# Patient Record
Sex: MALE | Race: WHITE | NOT HISPANIC OR LATINO | Employment: FULL TIME | URBAN - METROPOLITAN AREA
[De-identification: names, ages, dates, MRNs, and addresses within clinical notes are randomized per-mention and may not be internally consistent; named-entity substitution may affect disease eponyms.]

---

## 2024-02-13 ENCOUNTER — APPOINTMENT (OUTPATIENT)
Dept: PRIMARY CARE | Facility: CLINIC | Age: 24
End: 2024-02-13
Payer: COMMERCIAL

## 2024-04-24 ENCOUNTER — APPOINTMENT (OUTPATIENT)
Dept: RADIOLOGY | Facility: HOSPITAL | Age: 24
End: 2024-04-24
Payer: COMMERCIAL

## 2024-04-24 ENCOUNTER — HOSPITAL ENCOUNTER (EMERGENCY)
Facility: HOSPITAL | Age: 24
Discharge: HOME | End: 2024-04-24
Attending: EMERGENCY MEDICINE
Payer: COMMERCIAL

## 2024-04-24 ENCOUNTER — OFFICE VISIT (OUTPATIENT)
Dept: PRIMARY CARE | Facility: CLINIC | Age: 24
End: 2024-04-24
Payer: COMMERCIAL

## 2024-04-24 VITALS
DIASTOLIC BLOOD PRESSURE: 90 MMHG | HEART RATE: 111 BPM | WEIGHT: 242 LBS | BODY MASS INDEX: 36.68 KG/M2 | HEIGHT: 68 IN | SYSTOLIC BLOOD PRESSURE: 130 MMHG | OXYGEN SATURATION: 96 % | TEMPERATURE: 97.7 F

## 2024-04-24 VITALS
RESPIRATION RATE: 16 BRPM | SYSTOLIC BLOOD PRESSURE: 137 MMHG | BODY MASS INDEX: 36.42 KG/M2 | HEIGHT: 68 IN | HEART RATE: 95 BPM | DIASTOLIC BLOOD PRESSURE: 68 MMHG | WEIGHT: 240.3 LBS | OXYGEN SATURATION: 97 % | TEMPERATURE: 98.2 F

## 2024-04-24 DIAGNOSIS — R10.9 FLANK PAIN: ICD-10-CM

## 2024-04-24 DIAGNOSIS — R10.32 LEFT LOWER QUADRANT ABDOMINAL PAIN: Primary | ICD-10-CM

## 2024-04-24 DIAGNOSIS — M54.12 CERVICAL RADICULAR PAIN: ICD-10-CM

## 2024-04-24 DIAGNOSIS — N50.812 PAIN IN LEFT TESTICLE: ICD-10-CM

## 2024-04-24 DIAGNOSIS — R10.32 LEFT LOWER QUADRANT ABDOMINAL PAIN: ICD-10-CM

## 2024-04-24 DIAGNOSIS — N50.812 TESTICULAR PAIN, LEFT: ICD-10-CM

## 2024-04-24 DIAGNOSIS — R74.01 TRANSAMINITIS: ICD-10-CM

## 2024-04-24 DIAGNOSIS — R35.0 URINARY FREQUENCY: ICD-10-CM

## 2024-04-24 DIAGNOSIS — R03.0 ELEVATED BLOOD PRESSURE READING: Primary | ICD-10-CM

## 2024-04-24 DIAGNOSIS — M54.40 ACUTE BILATERAL LOW BACK PAIN WITH SCIATICA, SCIATICA LATERALITY UNSPECIFIED: ICD-10-CM

## 2024-04-24 PROBLEM — G47.9 DIFFICULTY SLEEPING: Status: ACTIVE | Noted: 2024-04-24

## 2024-04-24 PROBLEM — R51.9 HEADACHE: Status: ACTIVE | Noted: 2024-04-24

## 2024-04-24 PROBLEM — E66.9 OBESE: Status: ACTIVE | Noted: 2024-04-24

## 2024-04-24 PROBLEM — F41.9 ANXIETY: Status: ACTIVE | Noted: 2024-04-24

## 2024-04-24 PROBLEM — E78.5 HYPERLIPIDEMIA: Status: ACTIVE | Noted: 2024-04-24

## 2024-04-24 PROBLEM — F41.0 PANIC ATTACK: Status: ACTIVE | Noted: 2024-04-24

## 2024-04-24 PROBLEM — L70.9 ACNE: Status: ACTIVE | Noted: 2024-04-24

## 2024-04-24 LAB
ALBUMIN SERPL-MCNC: 4.6 G/DL (ref 3.5–5)
ALP BLD-CCNC: 85 U/L (ref 35–125)
ALT SERPL-CCNC: 55 U/L (ref 5–40)
ANION GAP SERPL CALC-SCNC: 8 MMOL/L
APPEARANCE UR: CLEAR
AST SERPL-CCNC: 42 U/L (ref 5–40)
BASOPHILS # BLD AUTO: 0.08 X10*3/UL (ref 0–0.1)
BASOPHILS NFR BLD AUTO: 0.7 %
BILIRUB SERPL-MCNC: 0.4 MG/DL (ref 0.1–1.2)
BILIRUB UR STRIP.AUTO-MCNC: NEGATIVE MG/DL
BUN SERPL-MCNC: 17 MG/DL (ref 8–25)
CALCIUM SERPL-MCNC: 9.7 MG/DL (ref 8.5–10.4)
CHLORIDE SERPL-SCNC: 102 MMOL/L (ref 97–107)
CO2 SERPL-SCNC: 25 MMOL/L (ref 24–31)
COLOR UR: YELLOW
CREAT SERPL-MCNC: 0.9 MG/DL (ref 0.4–1.6)
EGFRCR SERPLBLD CKD-EPI 2021: >90 ML/MIN/1.73M*2
EOSINOPHIL # BLD AUTO: 0.11 X10*3/UL (ref 0–0.7)
EOSINOPHIL NFR BLD AUTO: 0.9 %
ERYTHROCYTE [DISTWIDTH] IN BLOOD BY AUTOMATED COUNT: 12.3 % (ref 11.5–14.5)
GLUCOSE SERPL-MCNC: 94 MG/DL (ref 65–99)
GLUCOSE UR STRIP.AUTO-MCNC: NORMAL MG/DL
HCT VFR BLD AUTO: 41.8 % (ref 41–52)
HGB BLD-MCNC: 15.2 G/DL (ref 13.5–17.5)
IMM GRANULOCYTES # BLD AUTO: 0.08 X10*3/UL (ref 0–0.7)
IMM GRANULOCYTES NFR BLD AUTO: 0.7 % (ref 0–0.9)
KETONES UR STRIP.AUTO-MCNC: NEGATIVE MG/DL
LEUKOCYTE ESTERASE UR QL STRIP.AUTO: NEGATIVE
LIPASE SERPL-CCNC: 20 U/L (ref 16–63)
LYMPHOCYTES # BLD AUTO: 5.19 X10*3/UL (ref 1.2–4.8)
LYMPHOCYTES NFR BLD AUTO: 43.6 %
MCH RBC QN AUTO: 31.1 PG (ref 26–34)
MCHC RBC AUTO-ENTMCNC: 36.4 G/DL (ref 32–36)
MCV RBC AUTO: 86 FL (ref 80–100)
MONOCYTES # BLD AUTO: 0.96 X10*3/UL (ref 0.1–1)
MONOCYTES NFR BLD AUTO: 8.1 %
NEUTROPHILS # BLD AUTO: 5.48 X10*3/UL (ref 1.2–7.7)
NEUTROPHILS NFR BLD AUTO: 46 %
NITRITE UR QL STRIP.AUTO: NEGATIVE
NRBC BLD-RTO: 0 /100 WBCS (ref 0–0)
PH UR STRIP.AUTO: 6 [PH]
PLATELET # BLD AUTO: 295 X10*3/UL (ref 150–450)
POC APPEARANCE, URINE: CLEAR
POC BILIRUBIN, URINE: NEGATIVE
POC BLOOD, URINE: NEGATIVE
POC COLOR, URINE: YELLOW
POC GLUCOSE, URINE: NEGATIVE MG/DL
POC KETONES, URINE: NEGATIVE MG/DL
POC LEUKOCYTES, URINE: ABNORMAL
POC NITRITE,URINE: NEGATIVE
POC PH, URINE: 6.5 PH
POC PROTEIN, URINE: NEGATIVE MG/DL
POC SPECIFIC GRAVITY, URINE: >=1.03
POC UROBILINOGEN, URINE: 0.2 EU/DL
POTASSIUM SERPL-SCNC: 3.5 MMOL/L (ref 3.4–5.1)
PROT SERPL-MCNC: 7.3 G/DL (ref 5.9–7.9)
PROT UR STRIP.AUTO-MCNC: NEGATIVE MG/DL
RBC # BLD AUTO: 4.89 X10*6/UL (ref 4.5–5.9)
RBC # UR STRIP.AUTO: NEGATIVE /UL
SODIUM SERPL-SCNC: 135 MMOL/L (ref 133–145)
SP GR UR STRIP.AUTO: 1.03
UROBILINOGEN UR STRIP.AUTO-MCNC: NORMAL MG/DL
WBC # BLD AUTO: 11.9 X10*3/UL (ref 4.4–11.3)

## 2024-04-24 PROCEDURE — 87086 URINE CULTURE/COLONY COUNT: CPT | Mod: WESLAB | Performed by: NURSE PRACTITIONER

## 2024-04-24 PROCEDURE — 83690 ASSAY OF LIPASE: CPT | Performed by: EMERGENCY MEDICINE

## 2024-04-24 PROCEDURE — 96361 HYDRATE IV INFUSION ADD-ON: CPT

## 2024-04-24 PROCEDURE — 85025 COMPLETE CBC W/AUTO DIFF WBC: CPT | Performed by: EMERGENCY MEDICINE

## 2024-04-24 PROCEDURE — 74176 CT ABD & PELVIS W/O CONTRAST: CPT | Performed by: RADIOLOGY

## 2024-04-24 PROCEDURE — 2500000004 HC RX 250 GENERAL PHARMACY W/ HCPCS (ALT 636 FOR OP/ED): Performed by: EMERGENCY MEDICINE

## 2024-04-24 PROCEDURE — 96360 HYDRATION IV INFUSION INIT: CPT

## 2024-04-24 PROCEDURE — 74176 CT ABD & PELVIS W/O CONTRAST: CPT

## 2024-04-24 PROCEDURE — 99213 OFFICE O/P EST LOW 20 MIN: CPT | Performed by: NURSE PRACTITIONER

## 2024-04-24 PROCEDURE — 93975 VASCULAR STUDY: CPT

## 2024-04-24 PROCEDURE — 81003 URINALYSIS AUTO W/O SCOPE: CPT | Performed by: NURSE PRACTITIONER

## 2024-04-24 PROCEDURE — 99285 EMERGENCY DEPT VISIT HI MDM: CPT | Mod: 25

## 2024-04-24 PROCEDURE — 81003 URINALYSIS AUTO W/O SCOPE: CPT | Performed by: EMERGENCY MEDICINE

## 2024-04-24 PROCEDURE — 80053 COMPREHEN METABOLIC PANEL: CPT | Performed by: EMERGENCY MEDICINE

## 2024-04-24 PROCEDURE — 36415 COLL VENOUS BLD VENIPUNCTURE: CPT | Performed by: EMERGENCY MEDICINE

## 2024-04-24 RX ORDER — ACETAMINOPHEN 325 MG/1
975 TABLET ORAL ONCE
Status: DISCONTINUED | OUTPATIENT
Start: 2024-04-24 | End: 2024-04-25 | Stop reason: HOSPADM

## 2024-04-24 RX ORDER — KETOROLAC TROMETHAMINE 30 MG/ML
15 INJECTION, SOLUTION INTRAMUSCULAR; INTRAVENOUS ONCE
Status: DISCONTINUED | OUTPATIENT
Start: 2024-04-24 | End: 2024-04-25 | Stop reason: HOSPADM

## 2024-04-24 RX ORDER — IBUPROFEN 600 MG/1
600 TABLET ORAL EVERY 6 HOURS PRN
Qty: 20 TABLET | Refills: 0 | Status: SHIPPED | OUTPATIENT
Start: 2024-04-24 | End: 2024-04-29

## 2024-04-24 RX ADMIN — SODIUM CHLORIDE 1000 ML: 9 INJECTION, SOLUTION INTRAVENOUS at 21:45

## 2024-04-24 ASSESSMENT — ENCOUNTER SYMPTOMS
ABDOMINAL PAIN: 1
NAUSEA: 1

## 2024-04-24 ASSESSMENT — LIFESTYLE VARIABLES
HOW OFTEN DO YOU HAVE SIX OR MORE DRINKS ON ONE OCCASION: NEVER
SKIP TO QUESTIONS 9-10: 0
AUDIT-C TOTAL SCORE: 4
HOW OFTEN DO YOU HAVE A DRINK CONTAINING ALCOHOL: 2-3 TIMES A WEEK
HOW MANY STANDARD DRINKS CONTAINING ALCOHOL DO YOU HAVE ON A TYPICAL DAY: 3 OR 4

## 2024-04-24 ASSESSMENT — PAIN - FUNCTIONAL ASSESSMENT: PAIN_FUNCTIONAL_ASSESSMENT: 0-10

## 2024-04-24 ASSESSMENT — PAIN SCALES - GENERAL: PAINLEVEL: 4

## 2024-04-24 ASSESSMENT — COLUMBIA-SUICIDE SEVERITY RATING SCALE - C-SSRS
2. HAVE YOU ACTUALLY HAD ANY THOUGHTS OF KILLING YOURSELF?: NO
6. HAVE YOU EVER DONE ANYTHING, STARTED TO DO ANYTHING, OR PREPARED TO DO ANYTHING TO END YOUR LIFE?: NO
1. IN THE PAST MONTH, HAVE YOU WISHED YOU WERE DEAD OR WISHED YOU COULD GO TO SLEEP AND NOT WAKE UP?: NO

## 2024-04-24 ASSESSMENT — PATIENT HEALTH QUESTIONNAIRE - PHQ9
2. FEELING DOWN, DEPRESSED OR HOPELESS: NOT AT ALL
SUM OF ALL RESPONSES TO PHQ9 QUESTIONS 1 AND 2: 0
1. LITTLE INTEREST OR PLEASURE IN DOING THINGS: NOT AT ALL

## 2024-04-24 NOTE — PROGRESS NOTES
"Subjective   Patient ID: Shane Carney is a 24 y.o. male who presents for Abdominal Pain (Pt c/o stomach pain and left testicle pain x couple days/Pt also has ongoing back pain).    PT HAS HAD LOWER ABDOMINAL PAIN and testicular pain, worried he has cancer,     Abdominal Pain  This is a new problem. The current episode started in the past 7 days. The problem occurs constantly. The pain is located in the LLQ. The pain is at a severity of 4/10. The pain is moderate. Associated symptoms include nausea.        Review of Systems   Gastrointestinal:  Positive for abdominal pain and nausea.   Genitourinary:  Positive for testicular pain.       Objective   /90   Pulse (!) 111   Temp 36.5 °C (97.7 °F)   Ht 1.715 m (5' 7.5\")   Wt 110 kg (242 lb)   SpO2 96%   BMI 37.34 kg/m²     Physical Exam  Constitutional:       Appearance: Normal appearance. He is obese.   Pulmonary:      Effort: Pulmonary effort is normal.   Abdominal:      Tenderness: There is abdominal tenderness.      Comments: Lower abdominal tenderness no rebound ,    Genitourinary:     Comments: Has discomfort in left testicle.holding that area during visit  Musculoskeletal:         General: Normal range of motion.   Neurological:      Mental Status: He is alert and oriented to person, place, and time.   Psychiatric:         Behavior: Behavior normal.         Assessment/Plan   Problem List Items Addressed This Visit    None  Visit Diagnoses         Codes    Elevated blood pressure reading    -  Primary R03.0    Acute bilateral low back pain with sciatica, sciatica laterality unspecified     M54.40    Cervical radicular pain     M54.12    Pain in left testicle     N50.812    Left lower quadrant abdominal pain     R10.32               "

## 2024-04-24 NOTE — Clinical Note
Shane Carney was seen and treated in our emergency department on 4/24/2024.  He may return to work on 04/25/2024.       If you have any questions or concerns, please don't hesitate to call.      Jenn Campbell MD

## 2024-04-25 NOTE — ED PROVIDER NOTES
HPI   Chief Complaint   Patient presents with    Abdominal Pain     Pt states that he has been having LLQ abd pain and left testicle pain for about 4 days.  Pt states that he does heavy lifting at work a lot.  Pt was sent over by his PCP in the [facility]                    No data recorded                   Patient History   Past Medical History:   Diagnosis Date    Cough, unspecified 12/28/2013    Cough    Personal history of diseases of the skin and subcutaneous tissue 09/25/2013    History of folliculitis    Personal history of other diseases of the musculoskeletal system and connective tissue 02/11/2015    History of backache    Personal history of other diseases of the nervous system and sense organs 07/30/2015    History of acute otitis media    Personal history of other diseases of the nervous system and sense organs 12/28/2013    History of conjunctivitis    Personal history of other diseases of the respiratory system 06/02/2014    History of pharyngitis    Personal history of other diseases of the respiratory system 12/28/2013    History of pharyngitis    Personal history of other diseases of the respiratory system 06/02/2014    History of pharyngitis    Personal history of other infectious and parasitic diseases 12/28/2013    History of viral infection    Personal history of other infectious and parasitic diseases 12/05/2014    History of infectious mononucleosis    Personal history of other specified conditions 12/04/2014    History of fever    Personal history of other specified conditions 12/28/2013    History of nausea    Personal history of other specified conditions 12/04/2014    History of fatigue     No past surgical history on file.  Family History   Problem Relation Name Age of Onset    No Known Problems Mother      Diabetes Father       Social History     Tobacco Use    Smoking status: Every Day     Current packs/day: 1.50     Types: Cigarettes    Smokeless tobacco: Never    Substance Use Topics    Alcohol use: Yes    Drug use: Never       Physical Exam   ED Triage Vitals [04/24/24 2046]   Temperature Heart Rate Respirations BP   36.8 °C (98.2 °F) 95 16 137/68      Pulse Ox Temp Source Heart Rate Source Patient Position   97 % Oral Monitor Sitting      BP Location FiO2 (%)     Left arm --       Physical Exam    ED Course & MDM   Diagnoses as of 04/24/24 2332   Left lower quadrant abdominal pain   Testicular pain, left   Transaminitis   Flank pain       Medical Decision Making    The patient is a 24-year-old male presenting to the emergency department for evaluation of left-sided flank pain, left lower quadrant abdominal pain and left-sided testicular pain.  The patient states that he has been having intermittent pain in the left flank and left lower quadrant of his abdomen for the past 3 to 4 days.  He states he was doing some heavy lifting at work and then he was having worsening pain.  He states he went to urgent care and they told him he needed to come to the emergency room to have an ultrasound done of his testicle because the provider felt like he had pain when she did his exam.  He denies seeing any testicular pain on his own.  He denies any headache or visual changes.  No chest pain or shortness of breath.  No midline neck or back pain.  No focal weakness or numbness.  No fever or chills.  No cough or congestion.  No nausea, vomiting or diarrhea.  No urinary complaints.  No urethral discharge.  All pertinent positives and negatives are recorded above.  All other systems reviewed and otherwise negative.  Vital signs within normal limits.  Physical exam with a well-nourished well-developed male in no acute distress.  HEENT exam within normal limits.  He has no evidence of airway compromise or respiratory distress.  Abdominal exam is benign.  He has no gross motor, neurologic or vascular deficits on exam.  No flank pain with percussion or palpation.  No focal midline neck or back  pain with palpation.  Strength is 5 of 5 in all 4 extremities.  Sensation is intact.  Reflexes are intact.  He is able to walk and stand without difficulty.   exam within normal limits.  No palpable masses.  Testicles in normal position.      Oral acetaminophen, IV Toradol and IV fluids ordered.      Diagnostic labs without significant abnormality      US scrotum w doppler   Final Result   No sonographic evidence of torsion.        MACRO:   None             Signed by: Gloria Nance 4/24/2024 11:15 PM   Dictation workstation:   [id number]      CT abdomen pelvis wo IV contrast   Final Result   No acute abdominal or pelvic process.        Hepatic steatosis.        MACRO:   None        Signed by: Jojo Chin 4/24/2024 10:00 PM   Dictation workstation:   [id number]           CT abdomen pelvis with no evidence of ureterolithiasis.  No evidence of acute process such as pancreatitis, cholecystitis, choledocholithiasis, diverticulitis, or appendicitis.  No mass.  The testicular ultrasound does not show any evidence of torsion or mass.  The patient does not show any evidence of infection or significant lab abnormality other than some mild transaminitis on diagnostic labs.  He does not have any evidence of hemodynamic instability in the emergency room      Patient was released in good condition.  He was instructed to follow-up with his primary care physician within 1 to 2 days for further management of his current symptoms.  He was also given a referral to gastroenterology for further management of the transaminitis.  He will return to the emergency department sooner with worsening of symptoms or onset of new symptoms.  Rx given for ibuprofen.      Impression/diagnosis  Left-sided flank pain  Left lower quadrant abdominal pain  Testicular pain, left-sided  Transaminitis      I reviewed the results of the diagnostic labs and diagnostic imaging.  Formal radiology reading was completed by the  radiologist    Procedure  Procedures     Jenn Campbell MD  04/24/24 6712       Jenn Campbell MD  04/24/24 2112

## 2024-04-25 NOTE — DISCHARGE INSTRUCTIONS
Follow-up with your primary care physician within 1 to 2 days for further management of your current symptoms.      Follow-up with gastroenterology for further management of your elevated liver function test.    Return to the emergency department sooner with worsening of symptoms or onset of new symptoms

## 2024-04-26 LAB — BACTERIA UR CULT: NORMAL

## 2024-07-18 ENCOUNTER — APPOINTMENT (OUTPATIENT)
Dept: RADIOLOGY | Facility: HOSPITAL | Age: 24
End: 2024-07-18
Payer: COMMERCIAL

## 2024-07-18 ENCOUNTER — HOSPITAL ENCOUNTER (EMERGENCY)
Facility: HOSPITAL | Age: 24
Discharge: HOME | End: 2024-07-18
Attending: EMERGENCY MEDICINE
Payer: COMMERCIAL

## 2024-07-18 VITALS
TEMPERATURE: 98.6 F | BODY MASS INDEX: 37.36 KG/M2 | OXYGEN SATURATION: 97 % | HEART RATE: 89 BPM | SYSTOLIC BLOOD PRESSURE: 142 MMHG | RESPIRATION RATE: 17 BRPM | HEIGHT: 68 IN | WEIGHT: 246.47 LBS | DIASTOLIC BLOOD PRESSURE: 78 MMHG

## 2024-07-18 DIAGNOSIS — M79.5 SOFT TISSUES FOREIGN BODY: Primary | ICD-10-CM

## 2024-07-18 PROCEDURE — 2500000001 HC RX 250 WO HCPCS SELF ADMINISTERED DRUGS (ALT 637 FOR MEDICARE OP): Performed by: PHYSICIAN ASSISTANT

## 2024-07-18 PROCEDURE — 2500000004 HC RX 250 GENERAL PHARMACY W/ HCPCS (ALT 636 FOR OP/ED): Performed by: PHYSICIAN ASSISTANT

## 2024-07-18 PROCEDURE — 10120 INC&RMVL FB SUBQ TISS SMPL: CPT

## 2024-07-18 PROCEDURE — 99283 EMERGENCY DEPT VISIT LOW MDM: CPT | Mod: 25

## 2024-07-18 PROCEDURE — 73130 X-RAY EXAM OF HAND: CPT | Mod: RT

## 2024-07-18 PROCEDURE — 73130 X-RAY EXAM OF HAND: CPT | Mod: RIGHT SIDE | Performed by: RADIOLOGY

## 2024-07-18 PROCEDURE — 90471 IMMUNIZATION ADMIN: CPT | Performed by: PHYSICIAN ASSISTANT

## 2024-07-18 PROCEDURE — 90715 TDAP VACCINE 7 YRS/> IM: CPT | Performed by: PHYSICIAN ASSISTANT

## 2024-07-18 PROCEDURE — 2500000005 HC RX 250 GENERAL PHARMACY W/O HCPCS: Performed by: PHYSICIAN ASSISTANT

## 2024-07-18 RX ORDER — LIDOCAINE HYDROCHLORIDE 10 MG/ML
10 INJECTION INFILTRATION; PERINEURAL ONCE
Status: COMPLETED | OUTPATIENT
Start: 2024-07-18 | End: 2024-07-18

## 2024-07-18 RX ORDER — CEPHALEXIN 500 MG/1
500 CAPSULE ORAL ONCE
Status: COMPLETED | OUTPATIENT
Start: 2024-07-18 | End: 2024-07-18

## 2024-07-18 RX ORDER — ACETAMINOPHEN 325 MG/1
650 TABLET ORAL ONCE
Status: DISCONTINUED | OUTPATIENT
Start: 2024-07-18 | End: 2024-07-18 | Stop reason: HOSPADM

## 2024-07-18 RX ORDER — CEPHALEXIN 500 MG/1
500 CAPSULE ORAL 4 TIMES DAILY
Qty: 28 CAPSULE | Refills: 0 | Status: SHIPPED | OUTPATIENT
Start: 2024-07-18 | End: 2024-07-25

## 2024-07-18 ASSESSMENT — PAIN - FUNCTIONAL ASSESSMENT
PAIN_FUNCTIONAL_ASSESSMENT: 0-10
PAIN_FUNCTIONAL_ASSESSMENT: 0-10

## 2024-07-18 ASSESSMENT — COLUMBIA-SUICIDE SEVERITY RATING SCALE - C-SSRS
2. HAVE YOU ACTUALLY HAD ANY THOUGHTS OF KILLING YOURSELF?: NO
1. IN THE PAST MONTH, HAVE YOU WISHED YOU WERE DEAD OR WISHED YOU COULD GO TO SLEEP AND NOT WAKE UP?: NO
6. HAVE YOU EVER DONE ANYTHING, STARTED TO DO ANYTHING, OR PREPARED TO DO ANYTHING TO END YOUR LIFE?: NO

## 2024-07-18 ASSESSMENT — PAIN SCALES - GENERAL: PAINLEVEL_OUTOF10: 0 - NO PAIN

## 2024-07-18 NOTE — DISCHARGE INSTRUCTIONS
Be sure to follow up as directed in 1-2 days.  All of the details of your follow up instructions are detailed in the follow up section of this packet.         It is important to remember that your care does not end here and you must continue to monitor your condition closely. Please return to the emergency department for any worsening or concerning signs or symptoms as directed by our conversations and the discharge instructions. Otherwise please follow up with your doctor in 2 days if no better or worse. If you do not have a doctor please contact the referral number on your discharge instructions. Please contact any physician specialists provided in your discharge notes as it is very important to follow up with them regarding your condition. If you are unable to reach the physicians provided, please come back to the Emergency Department at any time.        Return to emergency room without delay for ANY new or worsening pains or for any other symptoms or concerns.

## 2024-07-18 NOTE — ED PROVIDER NOTES
HPI   Chief Complaint   Patient presents with    Hand Injury     Patient was at work, working with metal and a piece went into his right hand. Unknown last tetanus.       HPI  Patient 24-year-old male here for evaluation of possible metal foreign object in his right hand, right between the fourth and fifth digits on the dorsum of the hand he has a small area that appears to be a puncture wound and believes there is a piece of metal in it.  Unsure on last tetanus shot, no other area of pain or injury.      Patient History   Past Medical History:   Diagnosis Date    Cough, unspecified 12/28/2013    Cough    Personal history of diseases of the skin and subcutaneous tissue 09/25/2013    History of folliculitis    Personal history of other diseases of the musculoskeletal system and connective tissue 02/11/2015    History of backache    Personal history of other diseases of the nervous system and sense organs 07/30/2015    History of acute otitis media    Personal history of other diseases of the nervous system and sense organs 12/28/2013    History of conjunctivitis    Personal history of other diseases of the respiratory system 06/02/2014    History of pharyngitis    Personal history of other diseases of the respiratory system 12/28/2013    History of pharyngitis    Personal history of other diseases of the respiratory system 06/02/2014    History of pharyngitis    Personal history of other infectious and parasitic diseases 12/28/2013    History of viral infection    Personal history of other infectious and parasitic diseases 12/05/2014    History of infectious mononucleosis    Personal history of other specified conditions 12/04/2014    History of fever    Personal history of other specified conditions 12/28/2013    History of nausea    Personal history of other specified conditions 12/04/2014    History of fatigue     No past surgical history on file.  Family History   Problem Relation Name Age of Onset    No Known  Problems Mother      Diabetes Father       Social History     Tobacco Use    Smoking status: Every Day     Current packs/day: 1.50     Types: Cigarettes    Smokeless tobacco: Never   Substance Use Topics    Alcohol use: Yes    Drug use: Never       Physical Exam   ED Triage Vitals   Temp Pulse Resp BP   -- -- -- --      SpO2 Temp src Heart Rate Source Patient Position   -- -- -- --      BP Location FiO2 (%)     -- --       Physical Exam  GENERAL APPEARANCE: This patient is in no acute respiratory distress. Awake and alert.talking appropriately. Answering questions appropriately. No evidence of pressured speech     VITAL SIGNS: As per the nurses' triage record.     HEENT: Normocephalic, atraumatic.     NECK:  full gross ROM during exam    MUSCULOSKELETAL: Patient has a small puncture wound to the dorsal right hand between the MCP joint of the fourth and fifth digits, there is a very small area of dried blood around the entrance of the puncture wound.  No clear foreign object protruding.     NEUROLOGICAL: Awake, alert and oriented x 3.    IMMUNOLOGICAL: No palpable lymphadenopathy or lymphatic streaking noted on visible skin.    DERM: No petechiae, rashes, or ecchymoses. on visible skin    PSYCH: mood and affect appear normal.      ED Course & MDM   Diagnoses as of 07/18/24 1828   Soft tissues foreign body                       No data recorded                      Medical Decision Making  Parts of this chart have been completed using voice recognition software. Please excuse any errors of transcription.  My thought process and reason for plan has been formulated from the time that I saw the patient until the time of disposition and is not specific to one specific moment during their visit and furthermore my MDM encompasses this entire chart and not only this text box.      HPI: Detailed above.    Exam: A medically appropriate exam performed, outlined above, given the known history and presentation.    History Limited  by: Nothing    History obtained from: The patient    External/internal records reviewed: No external records reviewed    Social Determinants of Health considered during this visit: Lives at home    Chronic conditions impacting care: Denies    Medications given during visit:  Medications   lidocaine (Xylocaine) 10 mg/mL (1 %) injection 10 mL (has no administration in time range)   acetaminophen (Tylenol) tablet 650 mg (650 mg oral Not Given 7/18/24 1810)   cephalexin (Keflex) capsule 500 mg (has no administration in time range)   diphth,pertus(acell),tetanus (BoostRIX) 2.5-8-5 Lf-mcg-Lf/0.5mL vaccine 0.5 mL (0.5 mL intramuscular Given 7/18/24 1750)        Diagnostic/tests  Labs Reviewed - No data to display   XR hand right 3+ views   Final Result   Metallic radiopaque foreign object at the 4th interdigital space   dorsally. Small avulsion fracture at the head of the 5th metacarpal             MACRO:   None        Signed by: Renato Pinedo 7/18/2024 5:55 PM   Dictation workstation:   [id number]      XR hand right 3+ views    (Results Pending)       Prescription medications considered: Keflex initiated    Considerations/further MDM:  Differential includes fracture, dislocation, retained foreign object, neurovascular compromise or tendon dysfunction.            Procedure  Foreign Body Removal - Embedded    Performed by: Rocky Mckay PA-C  Authorized by: Jenn Campbell MD    Consent:     Consent obtained:  Verbal    Consent given by:  Patient  Universal protocol:     Patient identity confirmed:  Verbally with patient  Location:     Location:  Hand    Hand location:  R hand dorsum  Pre-procedure details:     Imaging:  X-ray    Neurovascular status: intact      Preparation: Patient was prepped and draped in usual sterile fashion    Anesthesia:     Anesthesia method:  Local infiltration    Local anesthetic:  Lidocaine 1% w/o epi  Procedure type:     Procedure complexity:  Simple  Procedure details:     Incision  length:  2mm    Removal mechanism: Pen magnet.    Guidance comment:  Magnet  Post-procedure details:     Skin closure:  None    Dressing:  Antibiotic ointment    Procedure completion:  Tolerated  Comments:      When the patient arrived when I asked him how he knew there was something in his skin he used a small magnet and was able to make it move underneath the skin, using a similar technique I was able to clearly and easily identify the foreign object while keeping the magnet attached I was able to just ever so slightly score the skin in the location in which it was starting to protrude on the magnet.  I was able to then open the skin with approximately a 2 mm incision with a scalpel and the object remained magnetized to the pen Magnant and was easily removed.  Removed in its entirety.  No complications, patient tolerated well.  Good neurovascular status and range of motion status post procedure.       Rocky Mckay PA-C  07/18/24 1825

## 2024-07-18 NOTE — PROGRESS NOTES
Attestation/Supervisory note for JB Mckay      The patient is a 24-year-old male presenting to the emergency department for evaluation of a possible foreign body in his right hand.  He states he was at work and was using a tool to work on some scrap metal.  He states that something flew off of it and punctured his right hand near the fourth finger knuckle.  He states it happened about 20 to 30 minutes prior to arrival.  No other injury or trauma.  No weakness or numbness.  Unknown date of last tetanus.  No headache or visual changes.  No chest pain or shortness of breath.  No abdominal pain.  No nausea vomiting.  No weakness or numbness.  All pertinent positives and negatives are recorded above.  All other systems reviewed and otherwise negative.  Vital signs within normal limits.  Physical exam with a well-nourished well-developed male in no acute distress.  HEENT exam within normal limits.  He has no evidence of airway compromise or respiratory distress.  Abdominal exam is benign.  He does have a superficial abrasion/laceration to the dorsum of the right hand near the fourth finger MCP joint.  Using a magnet, there is a foreign body that seems to move with it.      Wound care provided by nursing staff.  Tetanus was updated.      Oral acetaminophen was ordered      XR hand right 3+ views   Final Result   Metallic radiopaque foreign object at the 4th interdigital space   dorsally. Small avulsion fracture at the head of the 5th metacarpal             MACRO:   None        Signed by: Renato Pinedo 7/18/2024 5:55 PM   Dictation workstation:   [id number]           Wound repair and metallic foreign body removal was performed by JB Mckay without complication.      The patient was released in good condition.  He will follow-up with the [facility] of her scalp rotation provider within 1 to 2 days for wound check.  He will return to the emergency department sooner with worsening of symptoms or onset of new  symptoms      Impression/diagnosis:  Metallic foreign body, right hand  Puncture wound, right hand      I personally saw the patient and made/approve the management plan and take responsibility for the patient management.      I personally discussed the patient's management with the patient      I reviewed the results of the diagnostic imaging.  Formal radiology read was completed by the radiologist.      Jenn Campbell MD

## 2025-04-21 ENCOUNTER — APPOINTMENT (OUTPATIENT)
Dept: RADIOLOGY | Facility: HOSPITAL | Age: 25
End: 2025-04-21
Payer: COMMERCIAL

## 2025-04-21 ENCOUNTER — HOSPITAL ENCOUNTER (EMERGENCY)
Facility: HOSPITAL | Age: 25
Discharge: HOME | End: 2025-04-21
Payer: COMMERCIAL

## 2025-04-21 VITALS
HEIGHT: 68 IN | TEMPERATURE: 97.9 F | HEART RATE: 99 BPM | WEIGHT: 258.82 LBS | SYSTOLIC BLOOD PRESSURE: 160 MMHG | BODY MASS INDEX: 39.23 KG/M2 | OXYGEN SATURATION: 96 % | RESPIRATION RATE: 17 BRPM | DIASTOLIC BLOOD PRESSURE: 72 MMHG

## 2025-04-21 DIAGNOSIS — M54.12 CERVICAL RADICULOPATHY: Primary | ICD-10-CM

## 2025-04-21 PROCEDURE — 72125 CT NECK SPINE W/O DYE: CPT | Performed by: RADIOLOGY

## 2025-04-21 PROCEDURE — 72125 CT NECK SPINE W/O DYE: CPT

## 2025-04-21 PROCEDURE — 96372 THER/PROPH/DIAG INJ SC/IM: CPT

## 2025-04-21 PROCEDURE — 99284 EMERGENCY DEPT VISIT MOD MDM: CPT | Mod: 25

## 2025-04-21 PROCEDURE — 2500000005 HC RX 250 GENERAL PHARMACY W/O HCPCS

## 2025-04-21 PROCEDURE — 73030 X-RAY EXAM OF SHOULDER: CPT | Mod: LT

## 2025-04-21 PROCEDURE — 2500000004 HC RX 250 GENERAL PHARMACY W/ HCPCS (ALT 636 FOR OP/ED): Mod: JZ

## 2025-04-21 PROCEDURE — 73030 X-RAY EXAM OF SHOULDER: CPT | Mod: LEFT SIDE | Performed by: RADIOLOGY

## 2025-04-21 RX ORDER — METHOCARBAMOL 500 MG/1
500 TABLET, FILM COATED ORAL 2 TIMES DAILY
Qty: 20 TABLET | Refills: 0 | Status: SHIPPED | OUTPATIENT
Start: 2025-04-21 | End: 2025-05-01

## 2025-04-21 RX ORDER — ORPHENADRINE CITRATE 30 MG/ML
60 INJECTION INTRAMUSCULAR; INTRAVENOUS ONCE
Status: COMPLETED | OUTPATIENT
Start: 2025-04-21 | End: 2025-04-21

## 2025-04-21 RX ORDER — NAPROXEN 500 MG/1
500 TABLET ORAL
Qty: 30 TABLET | Refills: 0 | Status: SHIPPED | OUTPATIENT
Start: 2025-04-21 | End: 2025-05-06

## 2025-04-21 RX ORDER — PREDNISONE 50 MG/1
50 TABLET ORAL ONCE
Status: COMPLETED | OUTPATIENT
Start: 2025-04-21 | End: 2025-04-21

## 2025-04-21 RX ORDER — LIDOCAINE 50 MG/G
1 PATCH TOPICAL DAILY
Qty: 6 PATCH | Refills: 0 | Status: SHIPPED | OUTPATIENT
Start: 2025-04-21

## 2025-04-21 RX ORDER — PREDNISONE 20 MG/1
40 TABLET ORAL DAILY
Qty: 10 TABLET | Refills: 0 | Status: SHIPPED | OUTPATIENT
Start: 2025-04-21 | End: 2025-04-26

## 2025-04-21 RX ORDER — LIDOCAINE 560 MG/1
1 PATCH PERCUTANEOUS; TOPICAL; TRANSDERMAL ONCE
Status: DISCONTINUED | OUTPATIENT
Start: 2025-04-21 | End: 2025-04-21 | Stop reason: HOSPADM

## 2025-04-21 RX ORDER — KETOROLAC TROMETHAMINE 30 MG/ML
30 INJECTION, SOLUTION INTRAMUSCULAR; INTRAVENOUS ONCE
Status: COMPLETED | OUTPATIENT
Start: 2025-04-21 | End: 2025-04-21

## 2025-04-21 RX ADMIN — LIDOCAINE 1 PATCH: 4 PATCH TOPICAL at 18:45

## 2025-04-21 RX ADMIN — KETOROLAC TROMETHAMINE 30 MG: 30 INJECTION, SOLUTION INTRAMUSCULAR at 18:45

## 2025-04-21 RX ADMIN — PREDNISONE 50 MG: 50 TABLET ORAL at 18:46

## 2025-04-21 RX ADMIN — ORPHENADRINE CITRATE 60 MG: 30 INJECTION INTRAMUSCULAR; INTRAVENOUS at 18:45

## 2025-04-21 ASSESSMENT — PAIN SCALES - GENERAL: PAINLEVEL_OUTOF10: 7

## 2025-04-21 ASSESSMENT — PAIN DESCRIPTION - LOCATION: LOCATION: SHOULDER

## 2025-04-21 ASSESSMENT — PAIN DESCRIPTION - ORIENTATION: ORIENTATION: LEFT

## 2025-04-21 ASSESSMENT — PAIN DESCRIPTION - FREQUENCY: FREQUENCY: CONSTANT/CONTINUOUS

## 2025-04-21 ASSESSMENT — PAIN DESCRIPTION - DESCRIPTORS: DESCRIPTORS: SHARP

## 2025-04-21 ASSESSMENT — PAIN - FUNCTIONAL ASSESSMENT: PAIN_FUNCTIONAL_ASSESSMENT: 0-10

## 2025-04-21 ASSESSMENT — PAIN DESCRIPTION - PAIN TYPE: TYPE: ACUTE PAIN

## 2025-04-21 NOTE — ED PROVIDER NOTES
HPI   Chief Complaint   Patient presents with    Shoulder Pain     Presents to ed with a c/c of left shoulder pain. Started this morning after waking up. Has had surgery on left shoulder in the past. Limited movement. Further reports neck pain left side. Denies numbness or tingling        Patient is a 25-year-old male presenting to the emergency department for evaluation of left shoulder pain.  Patient states he woke up this morning to pain in the left shoulder and left trap region.  He denies any trauma or injury to the shoulder or neck.  He states he has chronic neck issues and it always feels tight.  He denies any significant tingling down the left arm.  He denies any chest pain, shortness of breath, fevers, chills, nausea, vomiting, abdominal pain.              Patient History   Medical History[1]  Surgical History[2]  Family History[3]  Social History[4]    Physical Exam   ED Triage Vitals   Temperature Heart Rate Respirations BP   04/21/25 1737 04/21/25 1737 04/21/25 1737 04/21/25 1738   36.6 °C (97.9 °F) (!) 104 16 160/72      Pulse Ox Temp Source Heart Rate Source Patient Position   04/21/25 1737 04/21/25 1737 04/21/25 1737 04/21/25 1737   96 % Tympanic Monitor Sitting      BP Location FiO2 (%)     04/21/25 1737 --     Right arm        Physical Exam  Vitals and nursing note reviewed.   Constitutional:       General: He is not in acute distress.     Appearance: Normal appearance. He is not ill-appearing or toxic-appearing.   HENT:      Head: Normocephalic and atraumatic.      Nose: Nose normal.      Mouth/Throat:      Mouth: Mucous membranes are moist.   Eyes:      Pupils: Pupils are equal, round, and reactive to light.   Cardiovascular:      Rate and Rhythm: Normal rate and regular rhythm.      Pulses: Normal pulses.   Pulmonary:      Effort: Pulmonary effort is normal.   Musculoskeletal:         General: Tenderness (Left upper trap with pain with range of motion of the shoulder.  No palpable deformities.)  present. No swelling, deformity or signs of injury.      Cervical back: Normal range of motion. No tenderness.   Skin:     General: Skin is warm and dry.      Capillary Refill: Capillary refill takes less than 2 seconds.   Neurological:      General: No focal deficit present.      Mental Status: He is alert and oriented to person, place, and time.      Sensory: No sensory deficit (Normal sensation in the radial, ulnar, and median nerve distribution of the left upper extremity).      Motor: No weakness.   Psychiatric:         Mood and Affect: Mood normal.         Behavior: Behavior normal.           ED Course & MDM   Diagnoses as of 04/21/25 2211   Cervical radiculopathy                 No data recorded     Elijah Coma Scale Score: 15 (04/21/25 1743 : Gina Baker RN)                           Medical Decision Making  **Disclaimer parts of this chart have been completed using voice recognition software. Please excuse any errors of transcription.     Evaluated this patient independently and my supervising physician was available for consultation.    HPI: Detailed above.    Exam: A medically appropriate exam performed, outlined above, given the known history and presentation.    History obtained from: Patient    Labs/Diagnostics:  XR shoulder left 2+ views   Final Result   No acute fracture.             MACRO:   None        Signed by: Essie Avitia 4/21/2025 6:52 PM   Dictation workstation:   [id number]      CT cervical spine wo IV contrast   Final Result   No acute fracture or traumatic subluxation of the cervical spine.        MACRO:   None        Signed by: Essie Aivtia 4/21/2025 6:52 PM   Dictation workstation:   [id number]        EMERGENCY DEPARTMENT COURSE and DIFFERENTIAL DIAGNOSIS/MDM:  Patient is a 25-year-old male presenting to the emergency department for evaluation of left shoulder pain.  On physical exam vital signs remarkable for systolic hypertension but otherwise stable and patient is in no  "acute distress.  Patient has tenderness to palpation in the left upper trap with limited range of motion of the left shoulder due to pain.  CT of the cervical spine ordered as well as x-ray of the left shoulder.  Patient does not have any midline tenderness on the cervical spine with no palpable step-offs or deformities.  CT of the cervical spine showed no acute fracture.  X-ray of the left shoulder showed no acute fracture. There is no sign of any neurovascular compromise in the bilateral upper extremities.  No known trauma or injury.  Patient symptoms consistent with cervical radiculopathy.  He was given IM Toradol, topical lidocaine patch, IM Norflex, and oral prednisone for symptoms.  He was discharged in stable condition and advised to follow-up with primary care physician outpatient within the next 1 to 2 days.  He will return to the emergency department with any new or worsening symptoms.    The patient presented with a chief complaint of left shoulder and trap pain. The differential diagnosis associated with this patient's presentation includes fracture, dislocation, musculoskeletal strain, cervical radiculopathy.     Vitals:    Vitals:    04/21/25 1737 04/21/25 1738 04/21/25 1743 04/21/25 1907   BP:  160/72  160/72   Pulse: (!) 104   99   Resp: 16   17   Temp: 36.6 °C (97.9 °F)      TempSrc: Tympanic      SpO2: 96%  96% 96%   Weight: 117 kg (258 lb 13.1 oz)      Height: 1.727 m (5' 8\")        History Limited by:    None    Independent history obtained from:    None    External records reviewed:    None    Diagnostics interpreted by me:    CT Scan(s) see MDM and Xrays - see my independent interpretation in MDM    Discussions with other clinicians:    None    Chronic conditions impacting care:    None    Social determinants of health affecting care:    None    Diagnostic tests considered but not performed: None    ED Medications managed:    Medications   ketorolac (Toradol) injection 30 mg (30 mg " intramuscular Given 4/21/25 1845)   orphenadrine (Norflex) injection 60 mg (60 mg intramuscular Given 4/21/25 1845)   predniSONE (Deltasone) tablet 50 mg (50 mg oral Given 4/21/25 1846)       Prescription drugs considered:     Prednisone, Robaxin, lidocaine patches    Screenings:              Procedure  Procedures       [1]   Past Medical History:  Diagnosis Date    Cough, unspecified 12/28/2013    Cough    Personal history of diseases of the skin and subcutaneous tissue 09/25/2013    History of folliculitis    Personal history of other diseases of the musculoskeletal system and connective tissue 02/11/2015    History of backache    Personal history of other diseases of the nervous system and sense organs 07/30/2015    History of acute otitis media    Personal history of other diseases of the nervous system and sense organs 12/28/2013    History of conjunctivitis    Personal history of other diseases of the respiratory system 06/02/2014    History of pharyngitis    Personal history of other diseases of the respiratory system 12/28/2013    History of pharyngitis    Personal history of other diseases of the respiratory system 06/02/2014    History of pharyngitis    Personal history of other infectious and parasitic diseases 12/28/2013    History of viral infection    Personal history of other infectious and parasitic diseases 12/05/2014    History of infectious mononucleosis    Personal history of other specified conditions 12/04/2014    History of fever    Personal history of other specified conditions 12/28/2013    History of nausea    Personal history of other specified conditions 12/04/2014    History of fatigue   [2] History reviewed. No pertinent surgical history.  [3]   Family History  Problem Relation Name Age of Onset    No Known Problems Mother      Diabetes Father     [4]   Social History  Tobacco Use    Smoking status: Every Day     Current packs/day: 1.50     Types: Cigarettes    Smokeless tobacco: Never    Substance Use Topics    Alcohol use: Yes    Drug use: Never        Sendy Parker PA-C  04/21/25 1035

## 2025-04-21 NOTE — DISCHARGE INSTRUCTIONS
Thank you for visiting [facility] emergency department.  It was a pleasure caring for you.    Be sure to take all medications, over the counter medications or prescription medications only as directed.     Be sure to follow up as directed in 1-2 days.  All of the details of your follow up instructions are detailed in the follow up section of this packet.    If you are being discharged with any pains medications or muscle relaxers (norco, Vicodin, hydrocodone products, Percocet, oxycodone products, flexeril, cyclobenzaprine, robaxin, norflex, brand or generic, or any other pain controlling medications with the exception of Ibuprofen and regular Tylenol, do not drive or operate machinery, climb ladders or participate in any activity that could potentially put yourself or others at risk should you get dizzy, or be/feel impaired at all.        It is important to remember that your care does not end here and you must continue to monitor your condition closely. Please return to the emergency department for any worsening or concerning signs or symptoms as directed by our conversations and the discharge instructions. Otherwise please follow up with your doctor in 2 days if no better or worse. If you do not have a doctor please contact the referral number on your discharge instructions. Please contact any physician specialists provided in your discharge notes as it is very important to follow up with them regarding your condition. If you are unable to reach the physicians provided, please come back to the Emergency Department at any time.     As always, please take medications as directed. If you have any questions at all regarding your medications, please contact the pharmacist, the emergency department, or your doctor. Before taking any medication prescribed in the Emergency Department, please review the medication side effects and drug interactions (<http://www.rxlist.com/script/main/hp.asp>) as they may interact with your  home medications.     Having trouble affording medications? Try [employer] <http://ShowEvidence/>! (This is not a hospital endorsed website, merely a recommendation based on my own personal experiences with [employer])      Return to emergency room without delay for ANY new or worsening pains or for any other symptoms or concerns.      Return with worsening pains, nausea, vomiting, trouble breathing, palpitations, shortness of breath, inability to pass stool or urine, loss of control of stool or urine, any numbness or tingling (that is not normal for you), uncontrolled fevers, the passing of blood or other material in stool or urine, rashes, pains or for any other symptoms or concerns you may have.  You are always welcome to return to the ER at any time for any reason or for any other concerns you may have.

## 2025-04-21 NOTE — Clinical Note
Shane Carney was seen and treated in our emergency department on 4/21/2025.  He may return to work on 04/23/2025.       If you have any questions or concerns, please don't hesitate to call.      Sendy Parker PA-C

## 2025-05-15 ENCOUNTER — OFFICE VISIT (OUTPATIENT)
Dept: URGENT CARE | Age: 25
End: 2025-05-15
Payer: COMMERCIAL

## 2025-05-15 VITALS
OXYGEN SATURATION: 97 % | TEMPERATURE: 97.6 F | HEART RATE: 95 BPM | BODY MASS INDEX: 39.53 KG/M2 | RESPIRATION RATE: 16 BRPM | SYSTOLIC BLOOD PRESSURE: 105 MMHG | WEIGHT: 260 LBS | DIASTOLIC BLOOD PRESSURE: 62 MMHG

## 2025-05-15 DIAGNOSIS — B34.8 RHINOVIRUS: ICD-10-CM

## 2025-05-15 LAB
POC HUMAN RHINOVIRUS PCR: POSITIVE
POC INFLUENZA A VIRUS PCR: NEGATIVE
POC INFLUENZA B VIRUS PCR: NEGATIVE
POC RESPIRATORY SYNCYTIAL VIRUS PCR: NEGATIVE
POC STREPTOCOCCUS PYOGENES (GROUP A STREP) PCR: NEGATIVE

## 2025-05-15 NOTE — PROGRESS NOTES
Subjective   Patient ID: Shane Carney is a 25 y.o. male. They present today with a chief complaint of Sore Throat (Started yesterday, exposed to strep about a week ago, no other symptoms.).    Past Medical History  Allergies as of 05/15/2025    (No Known Allergies)       Prescriptions Prior to Admission[1]     Medical History[2]    Surgical History[3]     reports that he has been smoking cigarettes. He has never used smokeless tobacco. He reports current alcohol use. He reports that he does not use drugs.    Review of Systems  ROS is negative unless otherwise stated in HPI.         Objective    Vitals:    05/15/25 0932   BP: 105/62   BP Location: Left arm   Patient Position: Sitting   BP Cuff Size: Adult long   Pulse: 95   Resp: 16   Temp: 36.4 °C (97.6 °F)   TempSrc: Oral   SpO2: 97%   Weight: 118 kg (260 lb)     No LMP for male patient.      VS: As documented in the triage note and EMR flowsheet from this visit was reviewed  General: Well appearing. No acute distress.   Eyes:  Extraocular movements grossly intact. No scleral icterus.   Head: Atraumatic. Normocephalic.     Neck: No meningismus. No gross masses. Full movement through range of motion  ENT: Posterior oropharynx shows no erythema, exudate or edema.  Uvula is midline without edema.  No stridor or trismus  CV: Regular rhythm. No murmurs, rubs, gallops appreciated.   Resp: Clear to auscultation bilaterally. No respiratory distress.    Skin: Warm, dry. No rashes  Neuro: CN II-VII intact. A&O x3. Speech fluent. Alert. Moving all extremities. Ambulates with normal gait  Psych: Appropriate mood and affect for situation      Point of Care Test & Imaging Results from this visit  Results for orders placed or performed in visit on 05/15/25   POCT SPOTFIRE R/ST Panel Mini w/Strep A ([employer]) manually resulted   Result Value Ref Range    POC Group A Strep, PCR Negative Negative    POC Respiratory Syncytial Virus PCR Negative Negative    POC Influenza A  Virus PCR Negative Negative    POC Influenza B Virus PCR Negative Negative    POC Human Rhinovirus PCR Positive (A) Negative      Imaging  No results found.    Cardiology, Vascular, and Other Imaging  No other imaging results found for the past 2 days      Diagnostic study results (if any) were reviewed by Yamila Cruz PA-C.    Assessment/Plan   Allergies, medications, history, and pertinent labs/EKGs/Imaging reviewed by Yamila Cruz PA-C.     Medical Decision Making  Patient is a 25-year-old male who presents for sore throat for the past day.  Notes associated fatigue.  On examination, patient overall well-appearing.  Vitals are stable.  Spot fire testing is positive for rhinovirus.  Patient advised on conservative management. Patient informed of the diagnosis.  They are agreeable to the plan as discussed above.  Patient given the opportunity to ask questions.  All of the patient's questions were answered. Given precautions in which to seek attention in the emergency department. Discussed follow up with PCP or other appropriate clinician.      Orders and Diagnoses  Diagnoses and all orders for this visit:  Rhinovirus  -     POCT SPOTFIRE R/ST Panel Mini w/Strep A ([facility]) manually resulted      Medical Admin Record      Patient disposition: Home    Electronically signed by Yamila Cruz PA-C  11:05 AM           [1] (Not in a hospital admission)   [2]   Past Medical History:  Diagnosis Date    Cough, unspecified 12/28/2013    Cough    Personal history of diseases of the skin and subcutaneous tissue 09/25/2013    History of folliculitis    Personal history of other diseases of the musculoskeletal system and connective tissue 02/11/2015    History of backache    Personal history of other diseases of the nervous system and sense organs 07/30/2015    History of acute otitis media    Personal history of other diseases of the nervous system and sense organs 12/28/2013    History of conjunctivitis     Personal history of other diseases of the respiratory system 06/02/2014    History of pharyngitis    Personal history of other diseases of the respiratory system 12/28/2013    History of pharyngitis    Personal history of other diseases of the respiratory system 06/02/2014    History of pharyngitis    Personal history of other infectious and parasitic diseases 12/28/2013    History of viral infection    Personal history of other infectious and parasitic diseases 12/05/2014    History of infectious mononucleosis    Personal history of other specified conditions 12/04/2014    History of fever    Personal history of other specified conditions 12/28/2013    History of nausea    Personal history of other specified conditions 12/04/2014    History of fatigue   [3] No past surgical history on file.